# Patient Record
Sex: MALE | Race: WHITE | NOT HISPANIC OR LATINO | Employment: FULL TIME | URBAN - METROPOLITAN AREA
[De-identification: names, ages, dates, MRNs, and addresses within clinical notes are randomized per-mention and may not be internally consistent; named-entity substitution may affect disease eponyms.]

---

## 2018-01-31 ENCOUNTER — OFFICE VISIT (OUTPATIENT)
Dept: URGENT CARE | Facility: CLINIC | Age: 33
End: 2018-01-31
Payer: COMMERCIAL

## 2018-01-31 VITALS
OXYGEN SATURATION: 96 % | BODY MASS INDEX: 37.18 KG/M2 | HEART RATE: 82 BPM | SYSTOLIC BLOOD PRESSURE: 128 MMHG | RESPIRATION RATE: 14 BRPM | HEIGHT: 75 IN | WEIGHT: 299 LBS | TEMPERATURE: 98.4 F | DIASTOLIC BLOOD PRESSURE: 84 MMHG

## 2018-01-31 DIAGNOSIS — B35.3 TINEA PEDIS OF LEFT FOOT: ICD-10-CM

## 2018-01-31 DIAGNOSIS — L03.032 CELLULITIS OF TOE OF LEFT FOOT: ICD-10-CM

## 2018-01-31 PROCEDURE — 99203 OFFICE O/P NEW LOW 30 MIN: CPT | Performed by: PHYSICIAN ASSISTANT

## 2018-01-31 RX ORDER — SULFAMETHOXAZOLE AND TRIMETHOPRIM 800; 160 MG/1; MG/1
TABLET ORAL
Qty: 20 TAB | Refills: 0 | Status: SHIPPED | OUTPATIENT
Start: 2018-01-31 | End: 2020-01-17

## 2018-01-31 ASSESSMENT — PATIENT HEALTH QUESTIONNAIRE - PHQ9: CLINICAL INTERPRETATION OF PHQ2 SCORE: 0

## 2018-01-31 ASSESSMENT — ENCOUNTER SYMPTOMS
FEVER: 0
CHILLS: 0

## 2018-01-31 NOTE — PROGRESS NOTES
"Subjective:      Rey Rivera is a 32 y.o. male who presents with Foot Problem (left foot)            Foot Problem   This is a recurrent problem. The current episode started in the past 7 days. The problem has been unchanged. Associated symptoms include a rash. Pertinent negatives include no chills or fever. Exacerbated by: sweating in his shoes. Treatments tried: otc creams  The treatment provided moderate relief.   Patient has a history of fungal infection of the toes. He's had cracking and itching and a small bump on the top of his left great toe. He's had MRSA in the past. He's tried over-the-counter creams for his tinea which has helped. He is here for evaluation    Past medical history: Is not pertinent to this examination  Past surgical history: Not pertinent to this examination  Family history: Is not pertinent to this examination  Allergies: No known drug allergies  Social history: Is reviewed in Epic      Review of Systems   Constitutional: Negative for chills and fever.   Skin: Positive for itching and rash.          Objective:     /84   Pulse 82   Temp 36.9 °C (98.4 °F)   Resp 14   Ht 1.905 m (6' 3\")   Wt (!) 135.6 kg (299 lb)   SpO2 96%   BMI 37.37 kg/m²      Physical Exam   Musculoskeletal:        Feet:           Patient's left foot: Full range motion normal strength against resistance, he has multiple areas of cracking and peeling in the interdigital webspace. On the dorsum of the left great toe there is a small 1 cm round papule that is nonfluctuant. There is no surrounding erythema or tenderness  Neurovascular: Intact with a 2 second cap refill good distal pulses and normal sensation       Assessment/Plan:     1. Tinea pedis of left foot    - terbinafine (LAMISIL) 1 % cream; Apply twice daily for two weeks  Dispense: 15 g; Refill: 0    2. Cellulitis of toe of left foot    - sulfamethoxazole-trimethoprim (BACTRIM DS) 800-160 MG tablet; Take one pill twice a day for ten days  Dispense: 20 " Tab; Refill: 0    Follow up if symptoms persist or worsen despite treamtent

## 2018-01-31 NOTE — LETTER
January 31, 2018         Patient: Rey Rivera   YOB: 1985   Date of Visit: 1/31/2018           To Whom it May Concern:    Rey Rivera was seen in my clinic on 1/31/2018. Please excuse from work on 1/31/18 and 2/1/18.   If you have any questions or concerns, please don't hesitate to call.        Sincerely,           Angela Ray P.A.-C.  Electronically Signed

## 2019-02-18 ENCOUNTER — OFFICE VISIT (OUTPATIENT)
Dept: URGENT CARE | Facility: CLINIC | Age: 34
End: 2019-02-18
Payer: COMMERCIAL

## 2019-02-18 VITALS
WEIGHT: 305 LBS | HEIGHT: 75 IN | TEMPERATURE: 102.6 F | OXYGEN SATURATION: 91 % | DIASTOLIC BLOOD PRESSURE: 68 MMHG | HEART RATE: 118 BPM | BODY MASS INDEX: 37.92 KG/M2 | SYSTOLIC BLOOD PRESSURE: 126 MMHG | RESPIRATION RATE: 16 BRPM

## 2019-02-18 DIAGNOSIS — J10.1 INFLUENZA A: Primary | ICD-10-CM

## 2019-02-18 DIAGNOSIS — R50.9 FEVER, UNSPECIFIED FEVER CAUSE: ICD-10-CM

## 2019-02-18 DIAGNOSIS — R68.89 INFLUENZA-LIKE SYMPTOMS: ICD-10-CM

## 2019-02-18 LAB
FLUAV+FLUBV AG SPEC QL IA: NORMAL
INT CON NEG: NORMAL
INT CON POS: NORMAL

## 2019-02-18 PROCEDURE — 99214 OFFICE O/P EST MOD 30 MIN: CPT | Performed by: NURSE PRACTITIONER

## 2019-02-18 PROCEDURE — 87804 INFLUENZA ASSAY W/OPTIC: CPT | Performed by: NURSE PRACTITIONER

## 2019-02-18 RX ORDER — OSELTAMIVIR PHOSPHATE 75 MG/1
75 CAPSULE ORAL 2 TIMES DAILY
Qty: 10 CAP | Refills: 0 | Status: SHIPPED | OUTPATIENT
Start: 2019-02-18 | End: 2020-01-17

## 2019-02-18 RX ORDER — IBUPROFEN 200 MG
400 TABLET ORAL ONCE
Status: COMPLETED | OUTPATIENT
Start: 2019-02-18 | End: 2019-02-18

## 2019-02-18 RX ADMIN — Medication 400 MG: at 12:37

## 2019-02-18 ASSESSMENT — ENCOUNTER SYMPTOMS
BACK PAIN: 1
PALPITATIONS: 0
FOCAL WEAKNESS: 0
VOMITING: 0
DIZZINESS: 0
SENSORY CHANGE: 0
NAUSEA: 1
WHEEZING: 0
BLURRED VISION: 0
BRUISES/BLEEDS EASILY: 0
HEMOPTYSIS: 0
SINUS PAIN: 0
ORTHOPNEA: 0
NECK PAIN: 0
CONSTIPATION: 0
STRIDOR: 0
CHILLS: 1
EYE REDNESS: 0
SPUTUM PRODUCTION: 1
DIAPHORESIS: 0
SORE THROAT: 1
FEVER: 1
DIARRHEA: 0
MYALGIAS: 1
ABDOMINAL PAIN: 0
COUGH: 1
HEADACHES: 1
TINGLING: 0
EYE PAIN: 0
SHORTNESS OF BREATH: 0

## 2019-02-18 ASSESSMENT — LIFESTYLE VARIABLES: SUBSTANCE_ABUSE: 0

## 2019-02-18 NOTE — PROGRESS NOTES
Subjective:      Rey Rivera is a 33 y.o. male who presents with Sore Throat (fever , cough, abdominal pain, chest hurts since friday)    Denies past medical, surgical or family history that is significant to today's problem.   RX or OTC medications-- reviewed with patient today.   No Known Allergies      Did not get an influenza vaccine this year.     HPI this is a new problem.  Rey is a 33-year-old male patient presents with sudden onset of sore throat, fever, cough, body aches, abdominal pain, chest pain when coughing.  Onset was 2 days ago.  Symptoms have been unchanged.  He has had fever almost continually for the past 2 days.  He has taken some over-the-counter Tylenol which is not been effective at alleviating his symptoms.  He feels very fatigued and tired.  He has missed work.    Review of Systems   Constitutional: Positive for chills, fever and malaise/fatigue. Negative for diaphoresis.   HENT: Positive for congestion and sore throat. Negative for ear discharge, ear pain, hearing loss, nosebleeds, sinus pain and tinnitus.    Eyes: Negative for blurred vision, pain and redness.   Respiratory: Positive for cough and sputum production. Negative for hemoptysis, shortness of breath, wheezing and stridor.    Cardiovascular: Positive for chest pain (Chest wall pain with coughing). Negative for palpitations, orthopnea and leg swelling.   Gastrointestinal: Positive for nausea. Negative for abdominal pain, constipation, diarrhea and vomiting.   Genitourinary: Negative for dysuria and frequency.   Musculoskeletal: Positive for back pain and myalgias. Negative for neck pain.   Skin: Negative for rash.   Neurological: Positive for headaches. Negative for dizziness, tingling, sensory change and focal weakness.   Endo/Heme/Allergies: Negative for environmental allergies. Does not bruise/bleed easily.   Psychiatric/Behavioral: Negative for substance abuse.          Objective:     /68 (BP Location: Right arm,  "Patient Position: Sitting, BP Cuff Size: Adult)   Pulse (!) 118   Temp (!) 39.2 °C (102.6 °F) (Temporal)   Resp 16   Ht 1.905 m (6' 3\")   Wt (!) 138.3 kg (305 lb)   SpO2 91%   BMI 38.12 kg/m²      Physical Exam   Constitutional: He is oriented to person, place, and time. Vital signs are normal. He appears well-developed and well-nourished. He is cooperative.  Non-toxic appearance. He appears ill.   HENT:   Head: Normocephalic.   Right Ear: Hearing, external ear and ear canal normal. Tympanic membrane is injected.   Left Ear: Hearing, external ear and ear canal normal. Tympanic membrane is injected.   Nose: Mucosal edema present. Right sinus exhibits no maxillary sinus tenderness and no frontal sinus tenderness. Left sinus exhibits no maxillary sinus tenderness and no frontal sinus tenderness.   Mouth/Throat: Uvula is midline and mucous membranes are normal. Posterior oropharyngeal erythema present. No oropharyngeal exudate. No tonsillar exudate.   Eyes: Pupils are equal, round, and reactive to light. Conjunctivae and lids are normal. Right eye exhibits no discharge. Left eye exhibits no discharge.   Neck: Trachea normal, normal range of motion, full passive range of motion without pain and phonation normal. Neck supple.   Cardiovascular: Normal rate, regular rhythm, normal heart sounds, intact distal pulses and normal pulses.    Pulmonary/Chest: Effort normal and breath sounds normal. No respiratory distress. He exhibits no tenderness.   Abdominal: Soft. Normal appearance. There is no tenderness.   Musculoskeletal: Normal range of motion.   Lymphadenopathy:        Head (right side): No submental, no submandibular, no tonsillar, no preauricular, no posterior auricular and no occipital adenopathy present.        Head (left side): No submental, no submandibular, no tonsillar, no preauricular, no posterior auricular and no occipital adenopathy present.     He has no cervical adenopathy.        Right: No " supraclavicular adenopathy present.        Left: No supraclavicular adenopathy present.   Neurological: He is alert and oriented to person, place, and time. He has normal strength.   Skin: Skin is warm. Capillary refill takes less than 2 seconds. No rash noted.   Psychiatric: He has a normal mood and affect. His speech is normal and behavior is normal. Thought content normal. Cognition and memory are normal.   Nursing note and vitals reviewed.         POCT influenza: positive A      Ibuprofen given in urgent care today.  Tolerated well.    Assessment/Plan:     1. Influenza A  oseltamivir (TAMIFLU) 75 MG Cap   2. Influenza-like symptoms  POCT Influenza A/B   3. Fever, unspecified fever cause  ibuprofen (MOTRIN) tablet 400 mg    POCT Influenza A/B     Educated in natural progression of disease with supportive care and symptomatic relief of symptoms. Educated in s/s that would need further evaluation and management in ER, UC or by PCP. Follow up prn for new or worsening symptoms.   Keep well hydrated- Increase rest  Treat fever > 101.5 with OTC ibuprofen or acetaminophen. Follow Dosage and safety directions of the .   Educated in infection control practices.   Do not return to work until fever free for 24 hours.     Patient was in agreement with this treatment plan and seemed to understand without barriers. All questions were encouraged and answered      Encouraged to get influenza vaccination

## 2019-02-18 NOTE — PATIENT INSTRUCTIONS
"Influenza A (H1N1)  H1N1 formerly called \"swine flu\" is a new influenza virus causing sickness in people. The H1N1 virus is different from seasonal influenza viruses. However, the H1N1 symptoms are similar to seasonal influenza and it is spread from person to person. You may be at higher risk for serious problems if you have underlying serious medical conditions. The CDC and the World Health Organization are following reported cases around the world.  CAUSES   · The flu is thought to spread mainly person-to-person through coughing or sneezing of infected people.  · A person may become infected by touching something with the virus on it and then touching their mouth or nose.  SYMPTOMS   · Fever.  · Headache.  · Tiredness.  · Cough.  · Sore throat.  · Runny or stuffy nose.  · Body aches.  · Diarrhea and vomiting  These symptoms are referred to as \"flu-like symptoms.\" A lot of different illnesses, including the common cold, may have similar symptoms.  DIAGNOSIS   · There are tests that can tell if you have the H1N1 virus.  · Confirmed cases of H1N1 will be reported to the state or local health department.  · A doctor's exam may be needed to tell whether you have an infection that is a complication of the flu.  HOME CARE INSTRUCTIONS   · Stay informed. Visit the CDC website for current recommendations. Visit www.cdc.gov/A9P7iyo/. You may also call 9-822-XJS-INFO (1-246.914.4474).  · Get help early if you develop any of the above symptoms.  · If you are at high risk from complications of the flu, talk to your caregiver as soon as you develop flu-like symptoms. Those at higher risk for complications include:  · People 65 years or older.  · People with chronic medical conditions.  · Pregnant women.  · Young children.  · Your caregiver may recommend antiviral medicine to help treat the flu.  · If you get the flu, get plenty of rest, drink enough water and fluids to keep your urine clear or pale yellow, and avoid using " alcohol or tobacco.  · You may take over-the-counter medicine to relieve the symptoms of the flu if your caregiver approves. (Never give aspirin to children or teenagers who have flu-like symptoms, particularly fever).  TREATMENT   If you do get sick, antiviral drugs are available. These drugs can make your illness milder and make you feel better faster. Treatment should start soon after illness starts. It is only effective if taken within the first day of becoming ill. Only your caregiver can prescribe antiviral medication.   PREVENTION   · Cover your nose and mouth with a tissue or your arm when you cough or sneeze. Throw the tissue away.  · Wash your hands often with soap and warm water, especially after you cough or sneeze. Alcohol-based  are also effective against germs.  · Avoid touching your eyes, nose or mouth. This is one way germs spread.  · Try to avoid contact with sick people. Follow public health advice regarding school closures. Avoid crowds.  · Stay home if you get sick. Limit contact with others to keep from infecting them. People infected with the H1N1 virus may be able to infect others anywhere from 1 day before feeling sick to 5-7 days after getting flu symptoms.  · An H1N1 vaccine is available to help protect against the virus. In addition to the H1N1 vaccine, you will need to be vaccinated for seasonal influenza. The H1N1 and seasonal vaccines may be given on the same day. The CDC especially recommends the H1N1 vaccine for:  · Pregnant women.  · People who live with or care for children younger than 6 months of age.  · Health care and emergency services personnel.  · Persons between the ages of 6 months through 24 years of age.  · People from ages 25 through 64 years who are at higher risk for H1N1 because of chronic health disorders or immune system problems.  FACEMASKS  In community and home settings, the use of facemasks and N95 respirators are not normally recommended. In certain  circumstances, a facemask or N95 respirator may be used for persons at increased risk of severe illness from influenza. Your caregiver can give additional recommendations for facemask use.  IN CHILDREN, EMERGENCY WARNING SIGNS THAT NEED URGENT MEDICAL CARE:  · Fast breathing or trouble breathing.  · Bluish skin color.  · Not drinking enough fluids.  · Not waking up or not interacting normally.  · Being so fussy that the child does not want to be held.  · Your child has an oral temperature above 102° F (38.9° C), not controlled by medicine.  · Your baby is older than 3 months with a rectal temperature of 102° F (38.9° C) or higher.  · Your baby is 3 months old or younger with a rectal temperature of 100.4° F (38° C) or higher.  · Flu-like symptoms improve but then return with fever and worse cough.  IN ADULTS, EMERGENCY WARNING SIGNS THAT NEED URGENT MEDICAL CARE:  · Difficulty breathing or shortness of breath.  · Pain or pressure in the chest or abdomen.  · Sudden dizziness.  · Confusion.  · Severe or persistent vomiting.  · Bluish color.  · You have a oral temperature above 102° F (38.9° C), not controlled by medicine.  · Flu-like symptoms improve but return with fever and worse cough.  SEEK IMMEDIATE MEDICAL CARE IF:   You or someone you know is experiencing any of the above symptoms. When you arrive at the emergency center, report that you think you have the flu. You may be asked to wear a mask and/or sit in a secluded area to protect others from getting sick.  MAKE SURE YOU:   · Understand these instructions.  · Will watch your condition.  · Will get help right away if you are not doing well or get worse.  Some of this information courtesy of the CDC.   Document Released: 06/05/2009 Document Revised: 03/11/2013 Document Reviewed: 06/05/2009  [employer]® Patient Information ©2014 [employer].

## 2019-02-18 NOTE — LETTER
February 18, 2019       Patient: Rey Rivera   YOB: 1985   Date of Visit: 2/18/2019         To Whom It May Concern:    It is my medical opinion that Rey Rivera remain out of work until 02/20/19 due to medical illness.            Sincerely,          TEODORO Delgadillo  Electronically Signed

## 2020-01-17 ENCOUNTER — HOSPITAL ENCOUNTER (EMERGENCY)
Facility: MEDICAL CENTER | Age: 35
End: 2020-01-17
Attending: EMERGENCY MEDICINE
Payer: COMMERCIAL

## 2020-01-17 VITALS
DIASTOLIC BLOOD PRESSURE: 78 MMHG | BODY MASS INDEX: 39.17 KG/M2 | RESPIRATION RATE: 18 BRPM | TEMPERATURE: 98.2 F | WEIGHT: 315 LBS | SYSTOLIC BLOOD PRESSURE: 122 MMHG | HEART RATE: 72 BPM | HEIGHT: 75 IN | OXYGEN SATURATION: 95 %

## 2020-01-17 DIAGNOSIS — J04.0 LARYNGITIS: ICD-10-CM

## 2020-01-17 DIAGNOSIS — J02.9 ACUTE VIRAL PHARYNGITIS: ICD-10-CM

## 2020-01-17 LAB
FLUAV RNA SPEC QL NAA+PROBE: NEGATIVE
FLUBV RNA SPEC QL NAA+PROBE: NEGATIVE
S PYO AG THROAT QL: NORMAL
SIGNIFICANT IND 70042: NORMAL
SITE SITE: NORMAL
SOURCE SOURCE: NORMAL

## 2020-01-17 PROCEDURE — 87502 INFLUENZA DNA AMP PROBE: CPT

## 2020-01-17 PROCEDURE — 87081 CULTURE SCREEN ONLY: CPT

## 2020-01-17 PROCEDURE — 99284 EMERGENCY DEPT VISIT MOD MDM: CPT

## 2020-01-17 PROCEDURE — 94640 AIRWAY INHALATION TREATMENT: CPT

## 2020-01-17 PROCEDURE — 700101 HCHG RX REV CODE 250: Performed by: EMERGENCY MEDICINE

## 2020-01-17 PROCEDURE — A9270 NON-COVERED ITEM OR SERVICE: HCPCS | Performed by: EMERGENCY MEDICINE

## 2020-01-17 PROCEDURE — 87880 STREP A ASSAY W/OPTIC: CPT

## 2020-01-17 PROCEDURE — 700102 HCHG RX REV CODE 250 W/ 637 OVERRIDE(OP): Performed by: EMERGENCY MEDICINE

## 2020-01-17 RX ORDER — DEXAMETHASONE 4 MG/1
12 TABLET ORAL ONCE
Status: COMPLETED | OUTPATIENT
Start: 2020-01-17 | End: 2020-01-17

## 2020-01-17 RX ORDER — IBUPROFEN 200 MG
800 TABLET ORAL EVERY 6 HOURS PRN
COMMUNITY

## 2020-01-17 RX ADMIN — ALBUTEROL SULFATE 2.5 MG: 2.5 SOLUTION RESPIRATORY (INHALATION) at 09:20

## 2020-01-17 RX ADMIN — DEXAMETHASONE 12 MG: 4 TABLET ORAL at 09:17

## 2020-01-17 RX ADMIN — IPRATROPIUM BROMIDE 0.5 MG: 0.5 SOLUTION RESPIRATORY (INHALATION) at 09:20

## 2020-01-17 ASSESSMENT — ENCOUNTER SYMPTOMS
ABDOMINAL PAIN: 0
DIARRHEA: 0
WHEEZING: 0
COUGH: 1
NAUSEA: 0
DIAPHORESIS: 1
VOMITING: 0
HEADACHES: 0
SORE THROAT: 1
MYALGIAS: 1
CONSTIPATION: 0
FEVER: 1

## 2020-01-17 ASSESSMENT — PAIN SCALES - WONG BAKER: WONGBAKER_NUMERICALRESPONSE: HURTS JUST A LITTLE BIT

## 2020-01-17 NOTE — ED PROVIDER NOTES
"ED Provider Note    ED Provider Note    Primary care provider: Pcp Pt States None  Means of arrival: POV  History obtained from: patient  History limited by: None    CHIEF COMPLAINT  Chief Complaint   Patient presents with   • Sore Throat   • Hoarse       HPI  Rey Taiwo Rivera is a 34 y.o. male who presents to the Emergency Department with a chief complaint of a sore throat.  He states he is had it for about a month.  He has had a hoarse voice.  Pain with swallowing.  He thought it would go away but has been persistent, now prompting his ED visit.  Does report that he started working with [employer] about 3 months ago and this started about 2 months into this new job.  He reports a \"on and off fever\".  He describes a cough particularly with inhalation and exhalation.  He has not been able to take his temperature as he has no means to do this but he has felt hot consistent with a tactile fever.  He denies taking any prescribed medication.  He has tried Tylenol and ibuprofen over the last several days.  He denies any vomiting.  No diarrhea.  His urine output has been normal.  Bowel movements have been normal.  He denies any past medical history.    REVIEW OF SYSTEMS  Review of Systems   Constitutional: Positive for diaphoresis and fever.   HENT: Positive for sore throat.    Respiratory: Positive for cough. Negative for wheezing.    Cardiovascular: Negative for chest pain.   Gastrointestinal: Negative for abdominal pain, constipation, diarrhea, nausea and vomiting.   Genitourinary: Negative for dysuria and urgency.   Musculoskeletal: Positive for myalgias.   Skin: Negative for rash.   Neurological: Negative for headaches.   All other systems reviewed and are negative.      PAST MEDICAL HISTORY   has a past medical history of MRSA (methicillin resistant staph aureus) culture positive.    SURGICAL HISTORY   has a past surgical history that includes other orthopedic surgery.    SOCIAL HISTORY  Social History " "    Tobacco Use   • Smoking status: Never Smoker   • Smokeless tobacco: Never Used   Substance Use Topics   • Alcohol use: Yes     Comment: occasional   • Drug use: No      Social History     Substance and Sexual Activity   Drug Use No       FAMILY HISTORY  No family history on file.    CURRENT MEDICATIONS  Home Medications     Reviewed by Juana Pena (Pharmacy Tech) on 01/17/20 at 0949  Med List Status: Complete   Medication Last Dose Status   ibuprofen (MOTRIN) 200 MG Tab 1/16/2020 Active                ALLERGIES  No Known Allergies    PHYSICAL EXAM  VITAL SIGNS: Pulse 74   Temp 36.8 °C (98.2 °F) (Temporal)   Resp 16   Ht 1.905 m (6' 3\")   Wt (!) 146.6 kg (323 lb 3.1 oz)   SpO2 93%   BMI 40.40 kg/m²   Vitals reviewed.  Constitutional: Patient is oriented to person, place, and time. Appears well-developed and well-nourished. Mild distress.  Obese Male.  Head: Normocephalic and atraumatic.   Ears: Normal external ears bilaterally.   Mouth/Throat: Oropharynx is clear and moist, erythema no exudates, no asymmetry, no swelling of the soft palate.   Eyes: Conjunctivae are normal. Pupils are equal, round, and reactive to light.   Neck: Normal range of motion. Neck supple.  Cardiovascular: Normal rate, regular rhythm and normal heart sounds. Normal peripheral pulses x4.  Pulmonary/Chest: Effort normal and breath sounds normal, though there is a bronchospastic cough with inhalation.  No respiratory distress, no wheezes, rhonchi, or rales. No chest wall tenderness.  Abdominal: Soft. Bowel sounds are normal. There is no tenderness. No rebound or guarding, or peritoneal signs. No CVA tenderness.  Musculoskeletal: No edema and no tenderness.   Lymphadenopathy: No cervical adenopathy.   Neurological: No focal deficits.   Skin: Skin is warm and dry. No erythema. No pallor.   Psychiatric: Patient has a normal mood and affect.     LABS  Results for orders placed or performed during the hospital encounter of " 01/17/20   Influenza A/B By PCR (Adult - Flu Only)   Result Value Ref Range    Influenza virus A RNA Negative Negative    Influenza virus B, PCR Negative Negative   RAPID STREP,CULT IF INDICATED   Result Value Ref Range    Significant Indicator NEG     Source THRT     Site -     Rapid Strep Screen       Negative for Group A streptococcus.  A negative result may be obtained if the specimen is  inadequate or antigen concentration is below the  sensitivity of the test. Therefore,a negative result  obtained from a rapid group A Strep test should be followed  up with a culture.         All labs reviewed by me.    COURSE & MEDICAL DECISION MAKING  Pertinent Labs & Imaging studies reviewed. (See chart for details)    Obtained and reviewed past medical records.  Patient's last encounter was an outpatient visit in February 2019, patient was seen for a sore throat diagnosed with influenza A.    8:14 AM - Patient seen and examined at bedside.  This is a previously well 34-year-old male who presents with a month of a sore throat, subjective on and off fever and a hoarse voice.  Was concern for influenza and possibly throat infection.  Presents because symptoms have been persistent.  I have obtained a throat swab.  He does not have clinical symptoms, to suggest peritonsillar abscess.  There is no asymmetry.  He is managing his own secretions.  There is no uvula swelling.  He will be treated with Decadron.  Of ordered influenza testing.  Also be treated with nebulizer therapy although his lungs are clear, he does have a bronchospastic cough on inhalation and I think he would benefit from nebulizer therapy.    10:33 AM patient's reevaluated the bedside.  He sitting up.  He is feeling slightly better.  We discussed results including a negative influenza and negative strep testing.  Likely indicating, viral etiology.  At this point, I see no indication for antibiotics.  He is advised on rest, supportive care, drinking fluids.  He  would normally be going to work today and I have advised against this.  He will be given a note to that effect.  Then his next working day would be Wednesday.  Patient has normal vital signs.  I feel he can safely be discharged home.  He is in stable condition.    FINAL IMPRESSION  1. Acute viral pharyngitis    2. Laryngitis

## 2020-01-17 NOTE — ED NOTES
ERP at bedside. Pt agrees with plan of care discussed by ERP. AIDET acknowledged with patient. Flu swab and throat to lab. RT at bedside.Gurney in low position, side rail up for pt safety. Call light within reach. Will continue to monitor.

## 2020-11-04 ENCOUNTER — HOSPITAL ENCOUNTER (OUTPATIENT)
Facility: MEDICAL CENTER | Age: 35
End: 2020-11-04
Attending: NURSE PRACTITIONER
Payer: OTHER GOVERNMENT

## 2020-11-04 ENCOUNTER — OFFICE VISIT (OUTPATIENT)
Dept: URGENT CARE | Facility: CLINIC | Age: 35
End: 2020-11-04
Payer: OTHER GOVERNMENT

## 2020-11-04 ENCOUNTER — TELEPHONE (OUTPATIENT)
Dept: URGENT CARE | Facility: CLINIC | Age: 35
End: 2020-11-04

## 2020-11-04 ENCOUNTER — NURSE TRIAGE (OUTPATIENT)
Dept: HEALTH INFORMATION MANAGEMENT | Facility: OTHER | Age: 35
End: 2020-11-04

## 2020-11-04 ENCOUNTER — APPOINTMENT (OUTPATIENT)
Dept: RADIOLOGY | Facility: IMAGING CENTER | Age: 35
End: 2020-11-04
Attending: NURSE PRACTITIONER
Payer: OTHER GOVERNMENT

## 2020-11-04 VITALS
RESPIRATION RATE: 20 BRPM | BODY MASS INDEX: 39.17 KG/M2 | HEART RATE: 104 BPM | TEMPERATURE: 99.3 F | OXYGEN SATURATION: 91 % | WEIGHT: 315 LBS | SYSTOLIC BLOOD PRESSURE: 126 MMHG | DIASTOLIC BLOOD PRESSURE: 78 MMHG | HEIGHT: 75 IN

## 2020-11-04 DIAGNOSIS — R50.81 FEVER IN OTHER DISEASES: ICD-10-CM

## 2020-11-04 DIAGNOSIS — J40 BRONCHITIS: ICD-10-CM

## 2020-11-04 DIAGNOSIS — R05.9 COUGH: ICD-10-CM

## 2020-11-04 DIAGNOSIS — J06.9 URI, ACUTE: ICD-10-CM

## 2020-11-04 LAB
FLUAV+FLUBV AG SPEC QL IA: NORMAL
INT CON NEG: NORMAL
INT CON POS: NORMAL

## 2020-11-04 PROCEDURE — 87804 INFLUENZA ASSAY W/OPTIC: CPT | Performed by: NURSE PRACTITIONER

## 2020-11-04 PROCEDURE — 99214 OFFICE O/P EST MOD 30 MIN: CPT | Performed by: NURSE PRACTITIONER

## 2020-11-04 PROCEDURE — U0003 INFECTIOUS AGENT DETECTION BY NUCLEIC ACID (DNA OR RNA); SEVERE ACUTE RESPIRATORY SYNDROME CORONAVIRUS 2 (SARS-COV-2) (CORONAVIRUS DISEASE [COVID-19]), AMPLIFIED PROBE TECHNIQUE, MAKING USE OF HIGH THROUGHPUT TECHNOLOGIES AS DESCRIBED BY CMS-2020-01-R: HCPCS

## 2020-11-04 PROCEDURE — 71046 X-RAY EXAM CHEST 2 VIEWS: CPT | Mod: TC,FY | Performed by: NURSE PRACTITIONER

## 2020-11-04 RX ORDER — ALBUTEROL SULFATE 90 UG/1
2 AEROSOL, METERED RESPIRATORY (INHALATION) EVERY 6 HOURS PRN
Qty: 8.5 G | Refills: 0 | Status: SHIPPED | OUTPATIENT
Start: 2020-11-04

## 2020-11-04 RX ORDER — AZITHROMYCIN 250 MG/1
TABLET, FILM COATED ORAL
Qty: 6 TAB | Refills: 0 | Status: SHIPPED | OUTPATIENT
Start: 2020-11-04

## 2020-11-04 RX ORDER — AMOXICILLIN 500 MG/1
1000 CAPSULE ORAL 3 TIMES DAILY
Qty: 60 CAP | Refills: 0 | Status: SHIPPED | OUTPATIENT
Start: 2020-11-04 | End: 2020-11-14

## 2020-11-04 ASSESSMENT — ENCOUNTER SYMPTOMS
SHORTNESS OF BREATH: 1
HEADACHES: 1
COUGH: 1
SWOLLEN GLANDS: 1
RHINORRHEA: 1
SORE THROAT: 1
FEVER: 1
SPUTUM PRODUCTION: 1
CHILLS: 1

## 2020-11-04 NOTE — PROGRESS NOTES
Subjective:      Rey Rivera is a 35 y.o. male who presents with Flu Like Symptoms (10/29 chills, fever, fatigue, sob, body aches, dry mouth)    Past Medical History:   Diagnosis Date   • MRSA (methicillin resistant staph aureus) culture positive     skin infection, not active     Social History     Socioeconomic History   • Marital status: Single     Spouse name: Not on file   • Number of children: Not on file   • Years of education: Not on file   • Highest education level: Not on file   Occupational History   • Not on file   Social Needs   • Financial resource strain: Not on file   • Food insecurity     Worry: Not on file     Inability: Not on file   • Transportation needs     Medical: Not on file     Non-medical: Not on file   Tobacco Use   • Smoking status: Never Smoker   • Smokeless tobacco: Never Used   Substance and Sexual Activity   • Alcohol use: Yes     Comment: occasional   • Drug use: No   • Sexual activity: Not on file   Lifestyle   • Physical activity     Days per week: Not on file     Minutes per session: Not on file   • Stress: Not on file   Relationships   • Social connections     Talks on phone: Not on file     Gets together: Not on file     Attends Rastafarian service: Not on file     Active member of club or organization: Not on file     Attends meetings of clubs or organizations: Not on file     Relationship status: Not on file   • Intimate partner violence     Fear of current or ex partner: Not on file     Emotionally abused: Not on file     Physically abused: Not on file     Forced sexual activity: Not on file   Other Topics Concern   • Not on file   Social History Narrative   • Not on file     History reviewed. No pertinent family history.    Allergies: Patient has no known allergies.    Patient is 35-year-old male who presents with complaint of fever, aches, chills.  Symptoms started 6 days ago.  Endorses increasing shortness of breath and malaise.  He works at [employer].  Denies vomiting or  "diarrhea.  Eating and drinking have been decreased.  He is a non-smoker.        URI   This is a new problem. The current episode started in the past 7 days. The problem has been gradually worsening. Associated symptoms include chest pain, congestion, coughing, headaches, rhinorrhea, a sore throat and swollen glands. He has tried nothing for the symptoms. The treatment provided no relief.       Review of Systems   Constitutional: Positive for chills, fever and malaise/fatigue.   HENT: Positive for congestion, rhinorrhea and sore throat.    Respiratory: Positive for cough, sputum production and shortness of breath.    Cardiovascular: Positive for chest pain.   Skin: Negative.    Neurological: Positive for headaches.   All other systems reviewed and are negative.         Objective:     /78 (BP Location: Left arm, Patient Position: Sitting, BP Cuff Size: Adult)   Pulse (!) 104   Temp 37.4 °C (99.3 °F) (Temporal)   Resp 20   Ht 1.905 m (6' 3\")   Wt (!) 145.2 kg (320 lb)   SpO2 91%   BMI 40.00 kg/m²      Physical Exam  Vitals signs reviewed.   Constitutional:       Appearance: Normal appearance.   HENT:      Head: Normocephalic and atraumatic.      Right Ear: Tympanic membrane, ear canal and external ear normal.      Left Ear: Tympanic membrane, ear canal and external ear normal.      Nose: Nose normal.      Mouth/Throat:      Mouth: Mucous membranes are moist.   Eyes:      Extraocular Movements: Extraocular movements intact.      Conjunctiva/sclera: Conjunctivae normal.      Pupils: Pupils are equal, round, and reactive to light.   Neck:      Musculoskeletal: Normal range of motion and neck supple.   Cardiovascular:      Rate and Rhythm: Normal rate and regular rhythm.      Heart sounds: Normal heart sounds.   Pulmonary:      Effort: No respiratory distress.      Breath sounds: Normal breath sounds. No stridor. No wheezing, rhonchi or rales.      Comments: Respirations are mildly labored  Chest:      Chest " wall: No tenderness.   Musculoskeletal: Normal range of motion.   Skin:     General: Skin is warm and dry.      Capillary Refill: Capillary refill takes less than 2 seconds.   Neurological:      Mental Status: He is alert and oriented to person, place, and time.   Psychiatric:         Mood and Affect: Mood normal.         Behavior: Behavior normal.         Thought Content: Thought content normal.         Judgment: Judgment normal.       poct influenza: negative     XR chest :     The mediastinal and cardiac silhouette is unremarkable.     The pulmonary vascularity is within normal limits.     Lungs are hypoinflated. There are ill-defined hazy linear opacities bilaterally.     There is no significant pleural effusion.     There is no visible pneumothorax.     There are no acute bony abnormalities.     IMPRESSION:     1.  Hypoinflation with hazy linear opacities possibly due to atelectasis. Imaging features can be seen with COVID-19 pneumonia, though are nonspecific and can occur with a variety of infectious and noninfectious processes.     poct influenza: negative       Patient's oxygen saturations noted to be 91% on room air.  He appears clinically ill.  I did discuss going to ER with him, however he refused at this time.  Patient states due to having no insurance he is choosing to manage this at home at this time.  Based on appearance of chest x-ray, I will start him on Zithromax, amoxicillin, and albuterol.  I have given patient strict ER precautions for any worsening of symptoms or increasing shortness of breath.  Patient verbalized understanding and agreement with plan of care.       Assessment/Plan:   URI  Cough  Fever    Covid nasal swab  Albuterol  zithromax  amoxil  Strict ER precautions for respiratory distress      There are no diagnoses linked to this encounter.

## 2020-11-04 NOTE — TELEPHONE ENCOUNTER
SOB, aches, fever, stomach pain, loss of taste, HA.  Started on 10/29 or 30.  No underlying health problems.  No travel.  No known covid exposure.  Works @ [employer].

## 2020-11-04 NOTE — LETTER
November 4, 2020       Patient: Rey Rivera   YOB: 1985   Date of Visit: 11/4/2020         To Whom It May Concern:    Rey Rivera was evaluated in urgent care on 11/4/20.        Sincerely,          Cathey J Hamman, A.P.N.  Electronically Signed

## 2020-11-05 DIAGNOSIS — J06.9 URI, ACUTE: ICD-10-CM

## 2020-11-05 DIAGNOSIS — R05.9 COUGH: ICD-10-CM

## 2020-11-05 DIAGNOSIS — R50.81 FEVER IN OTHER DISEASES: ICD-10-CM

## 2020-11-05 LAB
COVID ORDER STATUS COVID19: NORMAL
SARS-COV-2 RNA RESP QL NAA+PROBE: DETECTED
SPECIMEN SOURCE: ABNORMAL

## 2020-11-05 NOTE — TELEPHONE ENCOUNTER
Called patient by phone for follow-up.  Patient states he still feels ill, however states the albuterol is helping him breathe more easily.  He is tolerating medications without difficulty at this time.  Covid test is pending and I will notify him upon receiving this.  I did reiterate precautions to go to emergency room for increasing difficulty breathing.  Patient verbalized understanding and agreement with plan of care, no further questions at this time.

## 2020-11-06 ENCOUNTER — TELEPHONE (OUTPATIENT)
Dept: URGENT CARE | Facility: PHYSICIAN GROUP | Age: 35
End: 2020-11-06

## 2020-11-06 NOTE — TELEPHONE ENCOUNTER
Patient notified by phone of positive Covid test results. States he feels poorly still; states his breathing has improved with albuterol.  Strict ER precatuions and quarantine precautions reitierated. Advised taht tt heatlh department will also be in contact.  Call  for any further questions. Patient verbalized understanding and agreement with plan of care.

## 2025-02-20 ENCOUNTER — OFFICE VISIT (OUTPATIENT)
Dept: URGENT CARE | Facility: CLINIC | Age: 40
End: 2025-02-20
Payer: COMMERCIAL

## 2025-02-20 VITALS
HEART RATE: 84 BPM | TEMPERATURE: 98.7 F | OXYGEN SATURATION: 95 % | SYSTOLIC BLOOD PRESSURE: 136 MMHG | DIASTOLIC BLOOD PRESSURE: 88 MMHG | RESPIRATION RATE: 22 BRPM

## 2025-02-20 DIAGNOSIS — M62.838 CERVICAL PARASPINAL MUSCLE SPASM: ICD-10-CM

## 2025-02-20 PROCEDURE — 3079F DIAST BP 80-89 MM HG: CPT | Performed by: NURSE PRACTITIONER

## 2025-02-20 PROCEDURE — 3075F SYST BP GE 130 - 139MM HG: CPT | Performed by: NURSE PRACTITIONER

## 2025-02-20 PROCEDURE — 99203 OFFICE O/P NEW LOW 30 MIN: CPT | Performed by: NURSE PRACTITIONER

## 2025-02-20 RX ORDER — CYCLOBENZAPRINE HCL 10 MG
10 TABLET ORAL 3 TIMES DAILY PRN
Qty: 30 TABLET | Refills: 0 | Status: SHIPPED | OUTPATIENT
Start: 2025-02-20

## 2025-02-20 RX ORDER — METRONIDAZOLE 500 MG/1
500 TABLET ORAL 2 TIMES DAILY
Qty: 14 TABLET | Refills: 0 | Status: CANCELLED | OUTPATIENT
Start: 2025-02-20 | End: 2025-02-27

## 2025-02-20 ASSESSMENT — ENCOUNTER SYMPTOMS: NECK PAIN: 1

## 2025-02-20 NOTE — PROGRESS NOTES
Subjective     Rey Rivera is a 39 y.o. male who presents with Neck Pain (X 2 days, woke up this morning and the pain is severe, unable to move )            Neck Pain   This is a new problem. Episode onset: pt reports he slept wrong the last 2 nights and woke today with severe left sided neck pain. painful to move neck in any direction. denies any injury to neck. reports he has a distant hx of something like this before. He has tried acetaminophen, NSAIDs and heat (icy hot) for the symptoms. The treatment provided no relief.       Review of Systems   Musculoskeletal:  Positive for neck pain.   All other systems reviewed and are negative.         Past Medical History:   Diagnosis Date    MRSA (methicillin resistant staph aureus) culture positive     skin infection, not active      Past Surgical History:   Procedure Laterality Date    OTHER ORTHOPEDIC SURGERY      removed MRSA from L foot      Social History     Socioeconomic History    Marital status: Single     Spouse name: Not on file    Number of children: Not on file    Years of education: Not on file    Highest education level: Not on file   Occupational History    Not on file   Tobacco Use    Smoking status: Never    Smokeless tobacco: Never   Substance and Sexual Activity    Alcohol use: Yes     Comment: occasional    Drug use: No    Sexual activity: Not on file   Other Topics Concern    Not on file   Social History Narrative    Not on file     Social Drivers of Health     Financial Resource Strain: Not on file   Food Insecurity: Not on file   Transportation Needs: Not on file   Physical Activity: Not on file   Stress: Not on file   Social Connections: Not on file   Intimate Partner Violence: Not on file   Housing Stability: Not on file         Objective     /88   Pulse 84   Temp 37.1 °C (98.7 °F)   Resp (!) 22   SpO2 95%      Physical Exam  Vitals and nursing note reviewed.   Constitutional:       Appearance: Normal appearance.   HENT:       Head: Normocephalic and atraumatic.      Nose: Nose normal.      Mouth/Throat:      Mouth: Mucous membranes are moist.      Pharynx: Oropharynx is clear.   Eyes:      Extraocular Movements: Extraocular movements intact.      Pupils: Pupils are equal, round, and reactive to light.   Neck:     Cardiovascular:      Rate and Rhythm: Normal rate and regular rhythm.   Pulmonary:      Effort: Pulmonary effort is normal.   Musculoskeletal:         General: Normal range of motion.      Cervical back: Normal range of motion and neck supple.   Skin:     General: Skin is warm and dry.      Capillary Refill: Capillary refill takes less than 2 seconds.   Neurological:      General: No focal deficit present.      Mental Status: He is alert and oriented to person, place, and time. Mental status is at baseline.   Psychiatric:         Mood and Affect: Mood normal.         Thought Content: Thought content normal.         Judgment: Judgment normal.                                  Assessment & Plan  Cervical paraspinal muscle spasm    Orders:    cyclobenzaprine (FLEXERIL) 10 mg Tab; Take 1 Tablet by mouth 3 times a day as needed for Muscle Spasms.       Sedating effects of flexeril discussed  Continue to alternate tylenol and ibuprofen as needed for pain  Warm compresses   Gentle ROM exercises encouraged  Work note provided  Supportive care, differential diagnoses, and indications for immediate follow-up discussed with patient.    Pathogenesis of diagnosis discussed including typical length and natural progression.    Instructed to return to  or nearest emergency department if symptoms fail to improve, for any change in condition, further concerns, or new concerning symptoms.  Patient states understanding of the plan of care and discharge instructions.

## 2025-02-20 NOTE — LETTER
February 20, 2025    To Whom It May Concern:         This is confirmation that Rey Taiwo Miguel attended his scheduled appointment with SUNIL Hutchins on 2/20/25.         Please excuse him from work 2/20/25-2/21/25.    Sincerely,      GRIFFIN Hutchins.  [phone]

## 2025-02-22 ENCOUNTER — OFFICE VISIT (OUTPATIENT)
Dept: URGENT CARE | Facility: CLINIC | Age: 40
End: 2025-02-22
Payer: COMMERCIAL

## 2025-02-22 VITALS
WEIGHT: 315 LBS | HEIGHT: 75 IN | HEART RATE: 93 BPM | BODY MASS INDEX: 39.17 KG/M2 | TEMPERATURE: 97.8 F | OXYGEN SATURATION: 95 % | DIASTOLIC BLOOD PRESSURE: 84 MMHG | SYSTOLIC BLOOD PRESSURE: 130 MMHG | RESPIRATION RATE: 22 BRPM

## 2025-02-22 DIAGNOSIS — M62.838 CERVICAL PARASPINAL MUSCLE SPASM: ICD-10-CM

## 2025-02-22 PROCEDURE — 99213 OFFICE O/P EST LOW 20 MIN: CPT | Performed by: NURSE PRACTITIONER

## 2025-02-22 PROCEDURE — 3079F DIAST BP 80-89 MM HG: CPT | Performed by: NURSE PRACTITIONER

## 2025-02-22 PROCEDURE — 3075F SYST BP GE 130 - 139MM HG: CPT | Performed by: NURSE PRACTITIONER

## 2025-02-22 NOTE — LETTER
February 22, 2025    To Whom It May Concern:         This is confirmation that Rey Rivera attended a visit at the Urgent Care with Reshma CANCINO, on 2/20/25. [facility] allows patients to schedule same day appointments with our providers. These appointments are not scheduled as a regular doctor's office, rather this allows the patient to have a selected time slot for an urgent reason same day.   He was provided a most adequate work note on the day of his visit, 2/20/2025 to excuse him from 2/20/2025-2/21/2025 due to his acute condition.        If you have any questions please do not hesitate to call me at the phone number listed below.    Sincerely,      Chiara Stahl, A.P.R.N.  [phone]

## 2025-02-22 NOTE — PROGRESS NOTES
"Patient/parent/guardian has consented to treatment and for use of patient information for treatment and billing purposes.  Subjective:   Rey Rivera  is a 39 y.o. 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 He was seen and examined on 2/20/2025 for cervical/trapezius strain. He has some improvement with his ROM and less pain. Using the flexeril and icy hot regularly.   He is here because his employer refused his work note stating it said \"scheduled appt\" was not acceptable.      ROS      CURRENT MEDICATIONS:  albuterol Aers  azithromycin Tabs  cyclobenzaprine Tabs  ibuprofen Tabs  Allergies:   No Known Allergies  Current Problems: Rey Rivera does not have a problem list on file.  Past Surgical Hx:    Past Surgical History:   Procedure Laterality Date    OTHER ORTHOPEDIC SURGERY      removed MRSA from L foot      Past Social Hx:  reports that he has never smoked. He has never used smokeless tobacco. He reports current alcohol use. He reports that he does not use drugs.    Objective:   /84   Pulse 93   Temp 36.6 °C (97.8 °F)   Resp (!) 22   Ht 1.905 m (6' 3\")   Wt (!) 161 kg (356 lb)   SpO2 95%   BMI 44.50 kg/m²   Physical Exam  Vitals and nursing note reviewed.   Constitutional:       Appearance: Normal appearance. He is not ill-appearing.   HENT:      Right Ear: External ear normal.      Left Ear: External ear normal.      Nose: Nose normal.      Mouth/Throat:      Mouth: Mucous membranes are moist.   Eyes:      Extraocular Movements: Extraocular movements intact.      Conjunctiva/sclera: Conjunctivae normal.      Pupils: Pupils are equal, round, and reactive to light.   Neck:      Comments: Range of motion improved with rotation, still stiff.  Cardiovascular:      Rate and Rhythm: Normal rate.   Pulmonary:      Effort: " Pulmonary effort is normal.   Musculoskeletal:         General: Tenderness present.      Cervical back: No edema or erythema. Pain with movement present. Decreased range of motion.   Skin:     General: Skin is warm and dry.   Neurological:      General: No focal deficit present.      Mental Status: He is alert.       Assessment/Plan:   1. Cervical paraspinal muscle spasm    39-year-old male presents with request to have his work note clarified for his employer [employer].  Vital signs stable, afebrile.  He is in no acute distress does not appear ill.  Work note created together for accuracy to meet his HR requirements.  Recommend continuing the regimen provided 2 days ago as he is improving.  Should he have any changes he is encouraged to return to clinic for reexam.    Please note that this dictation was created using voice recognition software. I have made every reasonable attempt to correct obvious errors,  but there may be grammar errors, and possibly content that I did not discover before finalizing the note.   This note was electronically signed by SUNIL Dominguez